# Patient Record
Sex: FEMALE | Race: WHITE | NOT HISPANIC OR LATINO | Employment: FULL TIME | URBAN - METROPOLITAN AREA
[De-identification: names, ages, dates, MRNs, and addresses within clinical notes are randomized per-mention and may not be internally consistent; named-entity substitution may affect disease eponyms.]

---

## 2019-02-22 ENCOUNTER — OFFICE VISIT CONVERTED (OUTPATIENT)
Dept: FAMILY MEDICINE CLINIC | Facility: CLINIC | Age: 29
End: 2019-02-22
Attending: NURSE PRACTITIONER

## 2019-07-23 ENCOUNTER — OFFICE VISIT CONVERTED (OUTPATIENT)
Dept: FAMILY MEDICINE CLINIC | Facility: CLINIC | Age: 29
End: 2019-07-23
Attending: NURSE PRACTITIONER

## 2019-07-23 ENCOUNTER — CONVERSION ENCOUNTER (OUTPATIENT)
Dept: FAMILY MEDICINE CLINIC | Facility: CLINIC | Age: 29
End: 2019-07-23

## 2020-06-09 ENCOUNTER — OFFICE VISIT CONVERTED (OUTPATIENT)
Dept: FAMILY MEDICINE CLINIC | Facility: CLINIC | Age: 30
End: 2020-06-09
Attending: NURSE PRACTITIONER

## 2020-09-03 ENCOUNTER — HOSPITAL ENCOUNTER (OUTPATIENT)
Dept: URGENT CARE | Facility: CLINIC | Age: 30
Discharge: HOME | End: 2020-09-03

## 2020-09-06 LAB — SARS-COV-2 RNA SPEC QL NAA+PROBE: NOT DETECTED

## 2021-03-26 ENCOUNTER — OFFICE VISIT CONVERTED (OUTPATIENT)
Dept: FAMILY MEDICINE CLINIC | Facility: CLINIC | Age: 31
End: 2021-03-26
Attending: NURSE PRACTITIONER

## 2021-05-13 NOTE — PROGRESS NOTES
"   Progress Note      Patient Name: Khadijah Sierra   Patient ID: [id number]   Sex: Female   YOB: 1990    Primary Care Provider: Bradly Sandoval DO    Visit Date: June 9, 2020    Provider: ELIZABETH Ramirez   Location: [facility]   Location Address: [address] [place] [address], [address] [place] [address]  [id number]   Location Phone: [phone]          Chief Complaint  · Medication refills      History Of Present Illness  Khadijah Sierra is a 30 year old /White female who presents for evaluation and treatment of:      Patient presents to the office today for concerns of anxiety and depression.  She states that the fluoxetine and BuSpar is not working as effectively as it once did.  Patient states that her  was recently deployed and will not be back for 1 year.  She states that her anxiety has been exacerbated because of the increased possibilities as being a single parent as well as working and maintaining the house.  She states that her anxiety causes an increase in her depression.  Patient does state that she tried to take 15 mg of buspirone twice a day but this did not help as well.  He denies any suicidal or homicidal ideations at this time       Medication List  Ortho Tri-Cyclen LO (28) 0.18/0.215/0.25 mg-25 mcg oral tablet         Allergy List  NO KNOWN DRUG ALLERGIES         Social History  Tobacco (Never)         Review of Systems  · Constitutional  o Denies  o : fever, fatigue, weight loss, weight gain  · Cardiovascular  o Denies  o : lower extremity edema, claudication, chest pressure, palpitations  · Respiratory  o Denies  o : shortness of breath, wheezing, cough, hemoptysis, dyspnea on exertion  · Gastrointestinal  o Denies  o : nausea, vomiting, diarrhea, constipation, abdominal pain      Vitals  Date Time BP Position Site L\R Cuff Size HR RR TEMP (F) WT  HT  BMI kg/m2 BSA m2 O2 Sat HC       06/09/2020 01:21 /78 Sitting    58 - R 16 99 151lbs 0oz 5'  7\" 23.65 1.8 97 " %          Physical Examination  · Constitutional  o Appearance  o : well-nourished, in no acute distress  · Neck  o Inspection/Palpation  o : normal appearance, no masses or tenderness, trachea midline  o Range of Motion  o : cervical range of motion within normal limits  o Thyroid  o : gland size normal, nontender, no nodules or masses present on palpation  · Respiratory  o Respiratory Effort  o : breathing unlabored  o Inspection of Chest  o : normal appearance  o Auscultation of Lungs  o : normal breath sounds throughout inspiration and expiration  · Cardiovascular  o Heart  o :   § Auscultation of Heart  § : regular rate and rhythm, no murmurs, gallops or rubs  o Peripheral Vascular System  o :   § Extremities  § : no clubbing or edema  · Skin and Subcutaneous Tissue  o General Inspection  o : no rashes or lesions present, no areas of discoloration  o Body Hair  o : hair normal for age, general body hair distribution normal for age  o Digits and Nails  o : no clubbing, cyanosis, deformities or edema present, normal appearing nails  · Neurologic  o Mental Status Examination  o :   § Orientation  § : grossly oriented to person, place and time  o Gait and Station  o : normal gait, able to stand without difficulty  · Psychiatric  o Judgement and Insight  o : judgment and insight intact  o Mood and Affect  o : mood normal, affect appropriate  o Presence of Abnormal Thoughts  o : no hallucinations, no delusions present, no psychotic thoughts          Assessment  · Screening for depression     V79.0/Z13.89  · Anxiety disorder     300.00/F41.9  · Major depressive disorder     296.20/F32.2      Plan  · Orders  o Annual depression screening, 15 minutes (69690, ) - V79.0/Z13.89 - 06/09/2020  o ACO-18: Positive screen for clinical depression using a standardized tool and a follow-up plan documented () - V79.0/Z13.89 - 06/09/2020  o ACO-39: Current medications updated and reviewed () - - 06/09/2020  o ACO-14:  "Influenza immunization administered or previously received () - - 06/09/2020  · Medications  o Viibryd 40 mg oral tablet   SIG: take 1 tablet (40 mg) by oral route once daily with food   DISP: (30) tablets with 5 refills  Prescribed on 06/09/2020     o fluoxetine 40 mg oral capsule   SIG: TAKE 1 CAPSULE BY MOUTH EVERY EVENING   DISP: (30) Capsule with 0 refills  Discontinued on 06/09/2020     o Medications have been Reconciled  o Transition of Care or Provider Policy  · Instructions  o Depression Screen completed and scanned into the EMR under the designated folder within the patient's documents.  o Today's PHQ-9 result is ___10  o Discussed the need for therapy, either with a certified counselor, psychologist, and/or family . If no improvement is noted or worsening of their condition, return to office or ER. But also discussed with patient that if they are non-responsive to the type of medication they may need to see a psychiatrist for further evaluation and management.  o Patient was given an SSRI/SSNRI medication and warned of possible side effects of the medication including potential for increased risk of suicidal thoughts and feelings. Patient was instructed that if they begin to exhibit any of these effects they will discontinue the medication immediately and contact our office or the ER ASAP.  o Patient agrees to a \"No Self Harm\" contract. Patient will either call us, [facility], ER, [facility], [facility].  o Patient was educated/instructed on their diagnosis, treatment and medications prior to discharge from the clinic today.  o Time spent with the patient was minutes, more than 50% face to face.  o Electronically Identified Patient Education Materials Provided Electronically  · Disposition  o Call or Return if symptoms worsen or persist.  o follow up in 6 months            Electronically Signed by: ELIZABETH Ramirez -Author on June 9, 2020 02:55:44 PM  "

## 2021-05-14 VITALS
HEART RATE: 87 BPM | SYSTOLIC BLOOD PRESSURE: 112 MMHG | TEMPERATURE: 98.2 F | OXYGEN SATURATION: 97 % | DIASTOLIC BLOOD PRESSURE: 68 MMHG | HEIGHT: 67 IN | WEIGHT: 127 LBS | BODY MASS INDEX: 19.93 KG/M2

## 2021-05-14 NOTE — PROGRESS NOTES
Progress Note      Patient Name: Khadijah Sierra   Patient ID: [id number]   Sex: Female   YOB: 1990    Primary Care Provider: Bradly Sandoval DO    Visit Date: March 26, 2021    Provider: ELIZABETH Ramirez   Location: [facility]   Location Address: [address] [place] [address], [address] [place] [address]  [id number]   Location Phone: [phone]          Chief Complaint  · Talk about depression and anxiety      History Of Present Illness  Khadijah Sierra is a 30 year old /White female who presents for evaluation and treatment of:      Patient presents to the office today to discuss her anxiety and depression.  Patient states that it seems to be getting worse and the BuSpar is not helping.  Patient does state that the Viibryd has helped in the past and wants to continue this.  She states that she is also used hydroxyzine in the past but this just makes her sleepy.  Patient states that she had switched jobs due to drama at work.  She also states that her  has been on deployment for the last 9 months and was scheduled to return today.  He states that they tested positive for the Covid so they are in quarantine for another 10 days.    Patient states that her anxiety is worse due to her  having an affair on her  while he was deployed.  She states that he is unsure if she is even in love with him and does not know how things are going ago when he gets back in 10 days.  Patient also is anxious due to her knowing that she has to tell him that she did have the affair.       Medication List  Name Date Started Instructions   buspirone 7.5 mg oral tablet 03/26/2021 take 1 tablet (7.5 mg) by oral route 2 times per day   Tri-Lo-Sprintec 0.18/0.215/0.25 mg-25 mcg oral tablet 08/25/2020 take 1 tablet by oral route once daily for 30 days   Viibryd 40 mg oral tablet 03/26/2021 take 1 tablet (40 mg) by oral route once daily with food         Allergy List  Allergen Name Date  "Reaction Notes   NO KNOWN DRUG ALLERGIES --  --  --        Allergies Reconciled  Social History  Finding Status Start/Stop Quantity Notes   Tobacco Never --/-- --  --          Immunizations  NameDate Admin Mfg Trade Name Lot Number Route Inj VIS Given VIS Publication   InfluenzaDeferred 03/26/2021 NE Not Entered  NE NE     Comments:          Review of Systems  · Constitutional  o Denies  o : fever, fatigue, weight loss, weight gain  · Cardiovascular  o Denies  o : lower extremity edema, claudication, chest pressure, palpitations  · Respiratory  o Denies  o : shortness of breath, wheezing, cough, hemoptysis, dyspnea on exertion  · Gastrointestinal  o Denies  o : nausea, vomiting, diarrhea, constipation, abdominal pain  · Psychiatric  o Admits  o : anxiety, depression      Vitals  Date Time BP Position Site L\R Cuff Size HR RR TEMP (F) WT  HT  BMI kg/m2 BSA m2 O2 Sat FR L/min FiO2 HC       03/26/2021 01:24 /68 Sitting    87 - R  98.2 126lbs 16oz 5'  7\" 19.89 1.65 97 %            Physical Examination  · Constitutional  o Appearance  o : well-nourished, in no acute distress  · Neck  o Inspection/Palpation  o : normal appearance, no masses or tenderness, trachea midline  o Range of Motion  o : cervical range of motion within normal limits  o Thyroid  o : gland size normal, nontender, no nodules or masses present on palpation  · Respiratory  o Respiratory Effort  o : breathing unlabored  o Inspection of Chest  o : normal appearance  o Auscultation of Lungs  o : normal breath sounds throughout inspiration and expiration  · Cardiovascular  o Heart  o :   § Auscultation of Heart  § : regular rate and rhythm, no murmurs, gallops or rubs  o Peripheral Vascular System  o :   § Extremities  § : no clubbing or edema  · Skin and Subcutaneous Tissue  o General Inspection  o : no rashes or lesions present, no areas of discoloration  o Body Hair  o : hair normal for age, general body hair distribution normal for age  o Digits " "and Nails  o : no clubbing, cyanosis, deformities or edema present, normal appearing nails  · Neurologic  o Mental Status Examination  o :   § Orientation  § : grossly oriented to person, place and time  o Gait and Station  o : normal gait, able to stand without difficulty  · Psychiatric  o Judgement and Insight  o : judgment and insight intact  o Mood and Affect  o : mood normal, affect appropriate  o Presence of Abnormal Thoughts  o : no hallucinations, no delusions present, no psychotic thoughts          Assessment  · Anxiety disorder     300.00/F41.9  · Major depressive disorder     296.20/F32.2      Plan  · Orders  o ACO-39: Current medications updated and reviewed (1159F, ) - - 03/26/2021  · Medications  o Wellbutrin  mg oral tablet extended release 24 hr   SIG: take 1 tablet (150 mg) by oral route once daily   DISP: (90) Tablet with 1 refills  Prescribed on 03/26/2021     o Viibryd 40 mg oral tablet   SIG: take 1 tablet (40 mg) by oral route once daily with food   DISP: (90) Tablet with 3 refills  Refilled on 03/26/2021     o Medications have been Reconciled  o Transition of Care or Provider Policy  · Instructions  o Discussed the need for therapy, either with a certified counselor, psychologist, and/or family . If no improvement is noted or worsening of their condition, return to office or ER. But also discussed with patient that if they are non-responsive to the type of medication they may need to see a psychiatrist for further evaluation and management.  o Patient was given an SSRI/SSNRI medication and warned of possible side effects of the medication including potential for increased risk of suicidal thoughts and feelings. Patient was instructed that if they begin to exhibit any of these effects they will discontinue the medication immediately and contact our office or the ER ASAP.  o Patient agrees to a \"No Self Harm\" contract. Patient will either call us, [facility], ER, [facility] " [facility].  o Patient was educated/instructed on their diagnosis, treatment and medications prior to discharge from the clinic today.  o Minutes spent with patient including greater than 50% in Education/Counseling/Care Coordination.  o Time spent with the patient was minutes, more than 50% face to face.  o Electronically Identified Patient Education Materials Provided Electronically  · Disposition  o Call or Return if symptoms worsen or persist.  o Follow up in 3 months            Electronically Signed by: ELIZABETH Ramirez -Author on March 26, 2021 02:37:15 PM

## 2021-05-15 VITALS
SYSTOLIC BLOOD PRESSURE: 101 MMHG | HEART RATE: 82 BPM | TEMPERATURE: 98.7 F | RESPIRATION RATE: 18 BRPM | BODY MASS INDEX: 21.35 KG/M2 | HEIGHT: 67 IN | DIASTOLIC BLOOD PRESSURE: 61 MMHG | OXYGEN SATURATION: 99 % | WEIGHT: 136 LBS

## 2021-05-15 VITALS
TEMPERATURE: 99 F | DIASTOLIC BLOOD PRESSURE: 78 MMHG | WEIGHT: 151 LBS | OXYGEN SATURATION: 97 % | RESPIRATION RATE: 16 BRPM | BODY MASS INDEX: 23.7 KG/M2 | SYSTOLIC BLOOD PRESSURE: 116 MMHG | HEART RATE: 58 BPM | HEIGHT: 67 IN

## 2021-05-15 VITALS
SYSTOLIC BLOOD PRESSURE: 114 MMHG | RESPIRATION RATE: 18 BRPM | BODY MASS INDEX: 23.07 KG/M2 | TEMPERATURE: 98.5 F | HEART RATE: 80 BPM | DIASTOLIC BLOOD PRESSURE: 62 MMHG | WEIGHT: 147 LBS | OXYGEN SATURATION: 98 % | HEIGHT: 67 IN

## 2021-07-28 ENCOUNTER — TELEPHONE (OUTPATIENT)
Dept: FAMILY MEDICINE CLINIC | Facility: CLINIC | Age: 31
End: 2021-07-28

## 2021-07-28 NOTE — TELEPHONE ENCOUNTER
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 ALL OTHER SYMPTOMS ARE GONE EXCEPT SHORTNESS OF BREATH AND COUGH. COULD SOMETHING BE PHONED IN TO HELP PATIENT. PLEASE ADVISE PATIENT.

## 2021-07-28 NOTE — TELEPHONE ENCOUNTER
Caller: Khadijah Sierra    Relationship: Self    Best call back number: [phone]    What is the best time to reach you: ANYTIME    Who are you requesting to speak with (clinical staff, provider,  specific staff member): CLINICAL        What was the call regarding: PATIENT CALLED BACK TO CHECK ON STATUS OF PREVIOUS MESSAGE LEFT AND ALSO NOW IS REPORTING THAT SHE IS BREAKING OUT IN HIVES ALL OVER. ONLY THING PATIENT IS USING IS AN OVER THE COUNTER COUGH SUPPRESANT THAT SHE HAS USED BEFORE WITH NO ISSUES.                   Do you require a callback: YES

## 2021-09-27 ENCOUNTER — TELEPHONE (OUTPATIENT)
Dept: FAMILY MEDICINE CLINIC | Facility: CLINIC | Age: 31
End: 2021-09-27

## 2021-09-27 NOTE — TELEPHONE ENCOUNTER
Caller: Khadijah Sierra    Relationship: Self      Medication requested (name and dosage): buPROPion XL (WELLBUTRIN XL) 150 MG 24 hr tablet    Pharmacy where request should be sent: [id number] - [place], [place] - [address] AT [facility] - [phone]  - [phone]   [phone]    Additional details provided by patient:     Best call back number: [phone]    Does the patient have less than a 3 day supply:  [x] Yes  [] No    Cary Flores Rep   09/27/21 14:44 EDT

## 2021-09-28 RX ORDER — BUPROPION HYDROCHLORIDE 150 MG/1
150 TABLET ORAL EVERY MORNING
Qty: 90 TABLET | Refills: 0 | Status: SHIPPED | OUTPATIENT
Start: 2021-09-28 | End: 2021-09-29

## 2021-09-29 RX ORDER — BUPROPION HYDROCHLORIDE 150 MG/1
150 TABLET ORAL EVERY MORNING
Qty: 90 TABLET | Refills: 0 | Status: SHIPPED | OUTPATIENT
Start: 2021-09-29 | End: 2022-04-19 | Stop reason: SDUPTHER

## 2021-10-28 RX ORDER — NORGESTIMATE AND ETHINYL ESTRADIOL
KIT
Qty: 28 TABLET | Refills: 2 | Status: SHIPPED | OUTPATIENT
Start: 2021-10-28 | End: 2022-02-09 | Stop reason: SDUPTHER

## 2021-12-21 RX ORDER — VILAZODONE HYDROCHLORIDE 40 MG/1
TABLET ORAL
Qty: 30 TABLET | Status: CANCELLED | OUTPATIENT
Start: 2021-12-21

## 2021-12-21 NOTE — TELEPHONE ENCOUNTER
Patient is switching their insurance and it doesn't kick in until the first of the year. Patient is wanting to know if there is any way possible they can have some samples until their insurance is active next month.

## 2021-12-22 RX ORDER — VILAZODONE HYDROCHLORIDE 40 MG/1
40 TABLET ORAL DAILY
Qty: 28 TABLET | Refills: 0 | COMMUNITY
Start: 2021-12-22 | End: 2022-01-14 | Stop reason: SDUPTHER

## 2022-01-14 RX ORDER — VILAZODONE HYDROCHLORIDE 40 MG/1
40 TABLET ORAL DAILY
Qty: 30 TABLET | Refills: 2 | Status: SHIPPED | OUTPATIENT
Start: 2022-01-14 | End: 2022-01-19

## 2022-01-14 RX ORDER — VILAZODONE HYDROCHLORIDE 40 MG/1
40 TABLET ORAL DAILY
Qty: 28 TABLET | Refills: 0 | COMMUNITY
Start: 2022-01-14

## 2022-01-14 NOTE — TELEPHONE ENCOUNTER
Caller: Khadijah Sierra    Relationship: Self    Best call back number: [phone]     Requested Prescriptions:   Requested Prescriptions     Pending Prescriptions Disp Refills   • Viibryd 40 MG tablet tablet 28 tablet 0     Sig: Take 1 tablet by mouth Daily.        Pharmacy where request should be sent: [id number] [place] - [address] [facility] AT [facility] [phone] [facility] [phone] FX       Does the patient have less than a 3 day supply:  [] Yes  [x] No    Cary Doherty Rep   01/14/22 11:30 EST

## 2022-01-18 RX ORDER — BUPROPION HYDROCHLORIDE 150 MG/1
150 TABLET ORAL EVERY MORNING
Qty: 90 TABLET | Refills: 0 | OUTPATIENT
Start: 2022-01-18

## 2022-01-18 NOTE — TELEPHONE ENCOUNTER
LV: 3/26/2021  NV: none, will call and inform patient that they need to schedule an appointment.  LABS: 7/8/2020

## 2022-01-19 ENCOUNTER — OFFICE VISIT (OUTPATIENT)
Dept: FAMILY MEDICINE CLINIC | Facility: CLINIC | Age: 32
End: 2022-01-19

## 2022-01-19 VITALS
SYSTOLIC BLOOD PRESSURE: 110 MMHG | OXYGEN SATURATION: 99 % | WEIGHT: 154.2 LBS | BODY MASS INDEX: 23.37 KG/M2 | TEMPERATURE: 98.6 F | DIASTOLIC BLOOD PRESSURE: 64 MMHG | HEIGHT: 68 IN | HEART RATE: 92 BPM

## 2022-01-19 DIAGNOSIS — F33.0 MAJOR DEPRESSIVE DISORDER, RECURRENT, MILD: Primary | ICD-10-CM

## 2022-01-19 DIAGNOSIS — F41.9 ANXIETY: ICD-10-CM

## 2022-01-19 PROCEDURE — 99213 OFFICE O/P EST LOW 20 MIN: CPT | Performed by: NURSE PRACTITIONER

## 2022-01-19 RX ORDER — ESCITALOPRAM OXALATE 10 MG/1
10 TABLET ORAL DAILY
Qty: 90 TABLET | Refills: 0 | Status: SHIPPED | OUTPATIENT
Start: 2022-01-19 | End: 2022-03-15

## 2022-01-19 NOTE — PROGRESS NOTES
"Chief Complaint  Anxiety and Depression    Subjective          Khadijah Sierra presents to [facility]  Patient presents to the office today for follow-up regarding her anxiety and depression.  Patient states that her insurance is no longer covering the Viibryd and she would like to transition to Lexapro.  I did discuss how we would wean off of the Viibryd and transition to Lexapro.  Patient verbalized understanding of the titration.  She denies any other concerns or complaints at this time.      Objective   Vital Signs:   /64 (BP Location: Right arm, Patient Position: Sitting, Cuff Size: Adult)   Pulse 92   Temp 98.6 °F (37 °C) (Temporal)   Ht 172.7 cm (68\")   Wt 69.9 kg (154 lb 3.2 oz)   SpO2 99%   BMI 23.45 kg/m²     Physical Exam  Vitals reviewed.   Constitutional:       Appearance: Normal appearance.   Cardiovascular:      Rate and Rhythm: Normal rate and regular rhythm.      Heart sounds: Normal heart sounds, S1 normal and S2 normal. No murmur heard.      Pulmonary:      Effort: Pulmonary effort is normal. No respiratory distress.      Breath sounds: Normal breath sounds.   Skin:     General: Skin is warm and dry.   Neurological:      Mental Status: She is alert and oriented to person, place, and time.   Psychiatric:         Attention and Perception: Attention normal.         Mood and Affect: Mood normal.         Behavior: Behavior normal.        Result Review :                Assessment and Plan    Diagnoses and all orders for this visit:    1. Major depressive disorder, recurrent, mild (HCC) (Primary)  -     escitalopram (Lexapro) 10 MG tablet; Take 1 tablet by mouth Daily.  Dispense: 90 tablet; Refill: 0    2. Anxiety  -     escitalopram (Lexapro) 10 MG tablet; Take 1 tablet by mouth Daily.  Dispense: 90 tablet; Refill: 0        Follow Up   Return in about 6 months (around 7/19/2022) for Recheck.  Patient was given instructions and counseling regarding her " condition or for health maintenance advice. Please see specific information pulled into the AVS if appropriate.

## 2022-02-09 ENCOUNTER — OFFICE VISIT (OUTPATIENT)
Dept: OBSTETRICS AND GYNECOLOGY | Facility: CLINIC | Age: 32
End: 2022-02-09

## 2022-02-09 VITALS
HEIGHT: 68 IN | DIASTOLIC BLOOD PRESSURE: 68 MMHG | WEIGHT: 151.4 LBS | SYSTOLIC BLOOD PRESSURE: 109 MMHG | HEART RATE: 85 BPM | BODY MASS INDEX: 22.94 KG/M2

## 2022-02-09 DIAGNOSIS — Z01.419 ENCOUNTER FOR GYNECOLOGICAL EXAMINATION WITHOUT ABNORMAL FINDING: Primary | ICD-10-CM

## 2022-02-09 DIAGNOSIS — Z87.42 H/O ABNORMAL CERVICAL PAPANICOLAOU SMEAR: ICD-10-CM

## 2022-02-09 DIAGNOSIS — Z30.41 ENCOUNTER FOR SURVEILLANCE OF CONTRACEPTIVE PILLS: ICD-10-CM

## 2022-02-09 PROCEDURE — G0123 SCREEN CERV/VAG THIN LAYER: HCPCS | Performed by: OBSTETRICS & GYNECOLOGY

## 2022-02-09 PROCEDURE — 99395 PREV VISIT EST AGE 18-39: CPT | Performed by: OBSTETRICS & GYNECOLOGY

## 2022-02-09 PROCEDURE — 87624 HPV HI-RISK TYP POOLED RSLT: CPT | Performed by: OBSTETRICS & GYNECOLOGY

## 2022-02-09 RX ORDER — NORGESTIMATE AND ETHINYL ESTRADIOL 7DAYSX3 LO
1 KIT ORAL DAILY
Qty: 84 TABLET | Refills: 3 | Status: SHIPPED | OUTPATIENT
Start: 2022-02-09 | End: 2022-08-02

## 2022-02-09 NOTE — PROGRESS NOTES
"Well Woman Visit    CC: Annual well woman exam       HPI:   31 y.o. Contraception or HRT: APCONTRACEPTIONHRT: Contraception:  Birth control pill  Menses:   q mon, lasts 5 days, no heavy days  Pain:  None  Incontinence concerns: No  Hx of abnormal pap:  Yes, states last pap was abnormal and she had a colpo by dr santana and thinks everything was okay because she never received a phone call.  Pt has no complaints today.      History: PMHx, Meds, Allergies, PSHx, Social Hx, and POBHx all reviewed and updated.      PHYSICAL EXAM:  /68 (BP Location: Right arm, Patient Position: Sitting, Cuff Size: Small Adult)   Pulse 85   Ht 172.7 cm (67.99\")   Wt 68.7 kg (151 lb 6.4 oz)   LMP 2022 (Exact Date)   Breastfeeding No   BMI 23.03 kg/m²   General- NAD, alert and oriented, appropriate  Psych- Normal mood, good memory  Neck- No masses, no thyroid enlargement  CV- Regular rhythm, no murnurs  Resp- CTA to bases, no wheezes  Abdomen- Soft, non distended, non tender, no masses    Breast left-  Bilaterally symmetrical, no masses, non tender, no nipple discharge  Breast right- Bilaterally symmetrical, no masses, non tender, no nipple discharge    External genitalia- Normal female, no lesions  Urethra/meatus- Normal, no masses, non tender, no prolapse  Bladder- Normal, no masses, non tender, no prolapse  Vagina- Normal, no atrophy, no lesions, no discharge, no prolapse  Cvx- Normal, no lesions, no discharge, No cervical motion tenderness  Uterus- Normal size, shape & consistency.  Non tender, mobile, & no prolapse  Adnexa- No mass, non tender  Anus/Rectum/Perineum- Not performed    Lymphatic- No palpable neck, axillary, or groin nodes  Ext- No edema, no cyanosis    Skin- No lesions, no rashes, no acanthosis nigricans        ASSESSMENT and PLAN:  WWE and Contraception    Diagnoses and all orders for this visit:    1. Encounter for gynecological examination without abnormal finding (Primary)  -     Cancel: " IgP, Aptima HPV; Future  -     IgP, Aptima HPV    2. Encounter for surveillance of contraceptive pills  -     norgestimate-ethinyl estradiol (Tri-Lo-Sprintec) 0.18/0.215/0.25 MG-25 MCG per tablet; Take 1 tablet by mouth Daily.  Dispense: 84 tablet; Refill: 3    3. H/O abnormal cervical Papanicolaou smear  -     Cancel: IgP, Aptima HPV; Future  -     IgP, Aptima HPV    happy with current bcp and desires to continue. Will get co-test today. Unable to find scanned results of abnormal pap/colpo.    Counseling:     OCP/Hormone use risk CELSO  Track menses, RTO IF <q21d, >7d long, or heavy    Domestic violence/abuse screen: negative    Depression screen: no SI    Preventative:   BREAST HEALTH- Monthly self breast exam importance and how to reviewed. MMG and/or MRI (prn) reviewed per society guidelines and her individual history. Mammo/MRI screen: Not medically needed.  CERVICAL CANCER Screening- Reviewed current ASCCP guidelines for screening w and wo cotest HPV, age specific.  Screen: Updated today.  COLON CANCER Screening- Reviewed current medical society guidelines and options.  Colonoscopy screen:  Not medically needed.  SEXUAL HEALTH: Declines STD screening.  VACCINATIONS Recommended: Flu annually, Gardisil/HPV vaccine (up to 46yo).  Importance discussed, risk being unvaccinated reviewed.  Questions answered  Smoking status- NON SMOKER.  Importance of avoiding second hand smoke.  Myriad: Does not qualify.  Gardasil status: declined.      She understands the importance of having any ordered tests to be performed in a timely fashion.  She is encouraged to review her results online and/or contact or office if she has questions.     Follow Up:  Return if symptoms worsen or fail to improve.      Ruchi Joya, APRN  02/09/2022

## 2022-02-13 LAB
CYTOLOGIST CVX/VAG CYTO: NORMAL
CYTOLOGY CVX/VAG DOC CYTO: NORMAL
CYTOLOGY CVX/VAG DOC THIN PREP: NORMAL
DX ICD CODE: NORMAL
HIV 1 & 2 AB SER-IMP: NORMAL
HPV I/H RISK 4 DNA CVX QL PROBE+SIG AMP: NEGATIVE
OTHER STN SPEC: NORMAL
STAT OF ADQ CVX/VAG CYTO-IMP: NORMAL

## 2022-03-15 ENCOUNTER — TELEPHONE (OUTPATIENT)
Dept: FAMILY MEDICINE CLINIC | Facility: CLINIC | Age: 32
End: 2022-03-15

## 2022-03-15 ENCOUNTER — OFFICE VISIT (OUTPATIENT)
Dept: FAMILY MEDICINE CLINIC | Facility: CLINIC | Age: 32
End: 2022-03-15

## 2022-03-15 ENCOUNTER — HOSPITAL ENCOUNTER (OUTPATIENT)
Dept: GENERAL RADIOLOGY | Facility: HOSPITAL | Age: 32
Discharge: HOME | End: 2022-03-15
Admitting: STUDENT IN AN ORGANIZED HEALTH CARE EDUCATION/TRAINING PROGRAM

## 2022-03-15 VITALS
BODY MASS INDEX: 22.43 KG/M2 | SYSTOLIC BLOOD PRESSURE: 108 MMHG | RESPIRATION RATE: 18 BRPM | HEART RATE: 97 BPM | OXYGEN SATURATION: 97 % | TEMPERATURE: 97.8 F | HEIGHT: 68 IN | DIASTOLIC BLOOD PRESSURE: 64 MMHG | WEIGHT: 148 LBS

## 2022-03-15 DIAGNOSIS — M25.511 CHRONIC RIGHT SHOULDER PAIN: ICD-10-CM

## 2022-03-15 DIAGNOSIS — M25.511 CHRONIC RIGHT SHOULDER PAIN: Primary | ICD-10-CM

## 2022-03-15 DIAGNOSIS — G89.29 CHRONIC RIGHT SHOULDER PAIN: Primary | ICD-10-CM

## 2022-03-15 DIAGNOSIS — G89.29 CHRONIC RIGHT SHOULDER PAIN: ICD-10-CM

## 2022-03-15 DIAGNOSIS — F41.9 ANXIETY: ICD-10-CM

## 2022-03-15 DIAGNOSIS — F33.0 MAJOR DEPRESSIVE DISORDER, RECURRENT, MILD: ICD-10-CM

## 2022-03-15 PROCEDURE — 73030 X-RAY EXAM OF SHOULDER: CPT

## 2022-03-15 PROCEDURE — 99213 OFFICE O/P EST LOW 20 MIN: CPT | Performed by: STUDENT IN AN ORGANIZED HEALTH CARE EDUCATION/TRAINING PROGRAM

## 2022-03-15 RX ORDER — CYCLOBENZAPRINE HCL 10 MG
10 TABLET ORAL 2 TIMES DAILY PRN
Qty: 30 TABLET | Refills: 0 | Status: SHIPPED | OUTPATIENT
Start: 2022-03-15 | End: 2022-08-02

## 2022-03-15 RX ORDER — ESCITALOPRAM OXALATE 20 MG/1
20 TABLET ORAL DAILY
Qty: 90 TABLET | Refills: 1 | Status: SHIPPED | OUTPATIENT
Start: 2022-03-15 | End: 2022-06-03 | Stop reason: ALTCHOICE

## 2022-03-15 NOTE — TELEPHONE ENCOUNTER
Pt was here for a visit today with Dr. Vu for an acute visit. She notes she is ready to increase her Lexapro to 20 mg if possible.    Yes

## 2022-03-15 NOTE — PROGRESS NOTES
"Chief Complaint  Complaining of right shoulder pain    Subjective         Khadijah Sierra is a 31 y.o. female who presents to [facility]    31 years old female comes to the clinic today for an acute visit.    Patient is complaining of few months history of worsening right shoulder pain, patient is a paramedic, does report heavy lifting at work.  Patient reports dull constant pain which gets worse with certain movements/heavy lifting.  Reports sometimes right shoulder pain radiates to bicep but denies any weakness/numbness/tingling.  Denies any shoulder trauma, physically very active, goes to gym a lot.      Off note; patient also want Lexapro refill and increased dose.  Already spoken with PMD that if symptoms uncontrolled plan is to increase Lexapro to 20 mg.  Review of Systems   Objective   Vital Signs:   Vitals:    03/15/22 1503   BP: 108/64   Pulse: 97   Resp: 18   Temp: 97.8 °F (36.6 °C)   SpO2: 97%   Weight: 67.1 kg (148 lb)   Height: 172.7 cm (67.99\")      Body mass index is 22.51 kg/m².   Physical Exam  Musculoskeletal:      Right shoulder: Normal. No swelling, deformity, effusion, laceration, tenderness, bony tenderness or crepitus. Normal range of motion. Normal strength. Normal pulse.      Left shoulder: Normal. No swelling.      Comments: No abnormal right shoulder exam               Assessment and Plan   Diagnoses and all orders for this visit:    1. Chronic right shoulder pain (Primary)  Comments:  Primary conservative management/muscle relaxant/x-ray, may consider CT/MRI/orthopedic if needed  Orders:  -     XR Shoulder 2+ View Right; Future  -     cyclobenzaprine (FLEXERIL) 10 MG tablet; Take 1 tablet by mouth 2 (Two) Times a Day As Needed for Muscle Spasms.  Dispense: 30 tablet; Refill: 0    2. Major depressive disorder, recurrent, mild (HCC)  Comments:  Patient would like to increase Lexapro due to mildly uncontrolled symptoms, already spoken with PCP.  I will " increase Lexapro/follow-up with PMD as instructed  Orders:  -     escitalopram (Lexapro) 20 MG tablet; Take 1 tablet by mouth Daily.  Dispense: 90 tablet; Refill: 1    3. Anxiety  Comments:  Patient to call the clinic if no improvement or any worsening, side effect profile of increased dose of Lexapro discussed  Orders:  -     escitalopram (Lexapro) 20 MG tablet; Take 1 tablet by mouth Daily.  Dispense: 90 tablet; Refill: 1      For right shoulder pain, primary conservative management discussed.  Patient may take ibuprofen if needed.  We have increase Lexapro due to mildly uncontrolled symptoms and I have instructed to follow-up back if any side effects or no improvement for further interventions and medications management.  Patient understands and agrees with the plan.      Follow Up   Return if symptoms worsen or fail to improve.  Patient was given instructions and counseling regarding her condition or for health maintenance advice. Please see specific information pulled into the AVS if appropriate.

## 2022-04-16 DIAGNOSIS — F41.9 ANXIETY: ICD-10-CM

## 2022-04-16 DIAGNOSIS — F33.0 MAJOR DEPRESSIVE DISORDER, RECURRENT, MILD: ICD-10-CM

## 2022-04-18 RX ORDER — ESCITALOPRAM OXALATE 10 MG/1
10 TABLET ORAL DAILY
Qty: 90 TABLET | Refills: 0 | OUTPATIENT
Start: 2022-04-18

## 2022-04-19 RX ORDER — BUPROPION HYDROCHLORIDE 150 MG/1
150 TABLET ORAL EVERY MORNING
Qty: 90 TABLET | Refills: 1 | Status: SHIPPED | OUTPATIENT
Start: 2022-04-19 | End: 2022-08-02

## 2022-04-19 NOTE — TELEPHONE ENCOUNTER
Caller: RigoKhadijah    Relationship: Self    Best call back number: [phone]     Requested Prescriptions:   Requested Prescriptions     Pending Prescriptions Disp Refills   • buPROPion XL (WELLBUTRIN XL) 150 MG 24 hr tablet 90 tablet 0     Sig: Take 1 tablet by mouth Every Morning.        Pharmacy where request should be sent: [id number] [place] - [address] [facility] AT [facility] [phone] [facility] [phone] FX     Additional details provided by patient: COMPLETELY OUT    Does the patient have less than a 3 day supply:  [x] Yes  [] No    Cary JANG   04/19/22 09:18 EDT

## 2022-06-03 ENCOUNTER — TELEPHONE (OUTPATIENT)
Dept: FAMILY MEDICINE CLINIC | Facility: CLINIC | Age: 32
End: 2022-06-03

## 2022-06-03 NOTE — TELEPHONE ENCOUNTER
Caller: Khadijah Sierra    Relationship: Self    Best call back number: [phone]    What medication are you requesting: ZOLOFT    What are your current symptoms: ANXIETY AND DEPRESSION    Have you had these symptoms before:    [x] Yes  [] No    Have you been treated for these symptoms before:   [x] Yes  [] No    If a prescription is needed, what is your preferred pharmacy and phone number: [facility] #[id number] - [place], [place] - [address] AT [facility] [phone] [facility] [phone]      Additional notes:  PATIENT STATES SHE IS PREGNANT AND WOULD LIKE TO BE PUT BACK ON ZOLOFT AS IT IS SAFE FOR PREGNANCY. PATIENT STATES SHE WOULD LIKE TO COME OFF OF THE OTHER MEDICATIONS. PLEASE CALL PATIENT AND ADVISE.

## 2022-07-22 ENCOUNTER — TELEPHONE (OUTPATIENT)
Dept: FAMILY MEDICINE CLINIC | Facility: CLINIC | Age: 32
End: 2022-07-22

## 2022-07-22 RX ORDER — SERTRALINE HYDROCHLORIDE 100 MG/1
100 TABLET, FILM COATED ORAL DAILY
Qty: 30 TABLET | Refills: 3 | Status: SHIPPED | OUTPATIENT
Start: 2022-07-22 | End: 2022-10-26

## 2022-07-22 NOTE — TELEPHONE ENCOUNTER
Caller: Khadijah Sierra    Relationship: Self    Best call back number: [phone]    What medications are you currently taking:   Current Outpatient Medications on File Prior to Visit   Medication Sig Dispense Refill   • buPROPion XL (WELLBUTRIN XL) 150 MG 24 hr tablet Take 1 tablet by mouth Every Morning. 90 tablet 1   • busPIRone (BUSPAR) 7.5 MG tablet buspirone 7.5 mg oral tablet take 1 tablet (7.5 mg) by oral route 2 times per day 3/26/2021  Active     • cyclobenzaprine (FLEXERIL) 10 MG tablet Take 1 tablet by mouth 2 (Two) Times a Day As Needed for Muscle Spasms. 30 tablet 0   • norgestimate-ethinyl estradiol (Tri-Lo-Sprintec) 0.18/0.215/0.25 MG-25 MCG per tablet Take 1 tablet by mouth Daily. 84 tablet 3   • sertraline (Zoloft) 50 MG tablet Take 1 tablet by mouth Daily. 90 tablet 1     No current facility-administered medications on file prior to visit.          When did you start taking these medications: 06/22/22    Which medication are you concerned about: ZOLOFT    Who prescribed you this medication: BHUMI STAFFORD    What are your concerns: THE PATIENT'S MEDICATION WAS INCREASED TO 100MG BUT HER PRESCRIPTION WAS ONLY FOR 50MG. SHE TOOK 100MG DAILY AND NOW IS ENTIRELY OUT OF MEDICATION. SHE WOULD LIKE THIS PRESCRIPTION UPDATED TO 100MG AND A REFILL SENT TO:  [email or web address] #[id number] - [place], [place] - [address] AT [facility] - [phone] [facility] [phone]   [phone]    How long have you had these concerns: 07/22/22

## 2022-07-22 NOTE — TELEPHONE ENCOUNTER
Patient stated her Zoloft was increased to 100mg and needs a new prescription for it .     Pended zoloft 100mg

## 2022-08-02 ENCOUNTER — OFFICE VISIT (OUTPATIENT)
Dept: FAMILY MEDICINE CLINIC | Facility: CLINIC | Age: 32
End: 2022-08-02

## 2022-08-02 VITALS
OXYGEN SATURATION: 98 % | TEMPERATURE: 96.6 F | DIASTOLIC BLOOD PRESSURE: 57 MMHG | SYSTOLIC BLOOD PRESSURE: 107 MMHG | HEART RATE: 74 BPM | BODY MASS INDEX: 23.23 KG/M2 | HEIGHT: 67 IN | WEIGHT: 148 LBS

## 2022-08-02 DIAGNOSIS — F33.0 MAJOR DEPRESSIVE DISORDER, RECURRENT, MILD: Primary | ICD-10-CM

## 2022-08-02 PROCEDURE — 99213 OFFICE O/P EST LOW 20 MIN: CPT | Performed by: NURSE PRACTITIONER

## 2022-08-02 NOTE — PROGRESS NOTES
"Chief Complaint  Anxiety (Wants to change anxiety medication )    Subjective        Khadijah Sierra presents to [facility]  She presents to the office today wanting to discuss her anxiety and depression.  She states that she has been on the Zoloft for approximately 3 to 4 weeks.  She states that she has seen some improvement in her symptoms.  She states that she does not feel as if her symptoms are still well controlled.  She did want to discuss about possibly switching the Zoloft.  Does admit that she is currently trying to get pregnant.  I explained to the patient that due to this that I am not comfortable switching her medication to something different than Zoloft.  Did explain that we could increase the dosage to see if it was more efficacious.  Patient verbalizes understanding and agrees to plan.  Does state that she has been under more stress recently with work.  Denies any suicidal homicidal ideations.      Objective   Vital Signs:  /57 (BP Location: Right arm, Patient Position: Sitting, Cuff Size: Adult)   Pulse 74   Temp 96.6 °F (35.9 °C) (Temporal)   Ht 170.2 cm (67\")   Wt 67.1 kg (148 lb)   SpO2 98%   BMI 23.18 kg/m²   Estimated body mass index is 23.18 kg/m² as calculated from the following:    Height as of this encounter: 170.2 cm (67\").    Weight as of this encounter: 67.1 kg (148 lb).    BMI is within normal parameters. No other follow-up for BMI required.      Physical Exam  Vitals reviewed.   Constitutional:       Appearance: Normal appearance.   Cardiovascular:      Rate and Rhythm: Normal rate and regular rhythm.      Pulses: Normal pulses.      Heart sounds: Normal heart sounds, S1 normal and S2 normal. No murmur heard.  Pulmonary:      Effort: Pulmonary effort is normal. No respiratory distress.      Breath sounds: Normal breath sounds.   Skin:     General: Skin is warm and dry.   Neurological:      Mental Status: She is alert and oriented to " person, place, and time.   Psychiatric:         Attention and Perception: Attention normal.         Mood and Affect: Mood normal.         Behavior: Behavior normal.        Result Review :              Assessment and Plan   Diagnoses and all orders for this visit:    1. Major depressive disorder, recurrent, mild (HCC) (Primary)  -     sertraline (Zoloft) 50 MG tablet; Take 1 tablet by mouth Daily.  Dispense: 90 tablet; Refill: 1             Follow Up   Return in about 6 months (around 2/2/2023) for Recheck.  Patient was given instructions and counseling regarding her condition or for health maintenance advice. Please see specific information pulled into the AVS if appropriate.

## 2022-09-20 RX ORDER — BUPROPION HYDROCHLORIDE 150 MG/1
150 TABLET ORAL DAILY
Qty: 90 TABLET | Refills: 1 | Status: SHIPPED | OUTPATIENT
Start: 2022-09-20 | End: 2023-03-17

## 2022-10-26 RX ORDER — SERTRALINE HYDROCHLORIDE 100 MG/1
100 TABLET, FILM COATED ORAL DAILY
Qty: 30 TABLET | Refills: 3 | Status: SHIPPED | OUTPATIENT
Start: 2022-10-26 | End: 2022-11-29 | Stop reason: SDUPTHER

## 2022-11-15 DIAGNOSIS — F33.0 MAJOR DEPRESSIVE DISORDER, RECURRENT, MILD: ICD-10-CM

## 2022-11-29 ENCOUNTER — OFFICE VISIT (OUTPATIENT)
Dept: FAMILY MEDICINE CLINIC | Facility: CLINIC | Age: 32
End: 2022-11-29

## 2022-11-29 VITALS
DIASTOLIC BLOOD PRESSURE: 58 MMHG | HEIGHT: 67 IN | TEMPERATURE: 97.5 F | OXYGEN SATURATION: 98 % | BODY MASS INDEX: 22.13 KG/M2 | HEART RATE: 80 BPM | WEIGHT: 141 LBS | SYSTOLIC BLOOD PRESSURE: 100 MMHG

## 2022-11-29 DIAGNOSIS — F41.9 ANXIETY: Primary | ICD-10-CM

## 2022-11-29 DIAGNOSIS — F33.0 MAJOR DEPRESSIVE DISORDER, RECURRENT, MILD: ICD-10-CM

## 2022-11-29 DIAGNOSIS — Z30.41 ENCOUNTER FOR SURVEILLANCE OF CONTRACEPTIVE PILLS: ICD-10-CM

## 2022-11-29 PROCEDURE — 99214 OFFICE O/P EST MOD 30 MIN: CPT | Performed by: NURSE PRACTITIONER

## 2022-11-29 RX ORDER — NORGESTIMATE AND ETHINYL ESTRADIOL 7DAYSX3 LO
1 KIT ORAL DAILY
Qty: 84 TABLET | Refills: 1 | Status: SHIPPED | OUTPATIENT
Start: 2022-11-29 | End: 2023-11-29

## 2022-11-29 RX ORDER — SERTRALINE HYDROCHLORIDE 100 MG/1
100 TABLET, FILM COATED ORAL DAILY
Qty: 90 TABLET | Refills: 0 | Status: SHIPPED | OUTPATIENT
Start: 2022-11-29 | End: 2022-12-13 | Stop reason: ALTCHOICE

## 2022-11-29 NOTE — PROGRESS NOTES
"Chief Complaint  Anxiety (Genesight)    Subjective         Khadijah Sierra presents to [facility]  Patient presents to the office today to discuss her anxiety and depression.  She states that she has increased her Zoloft to 150 mg over time due to it not working as effectively.  She does state that she is also taking her Wellbutrin.  She states that the regimen is not working as good as she hoped.  She does state that she would like to get the GeneSight test completed so she would know for sure an appropriate regimen.  He denies any suicidal homicidal ideations at this time.       Objective     Vitals:    11/29/22 1111   BP: 100/58   BP Location: Right arm   Patient Position: Sitting   Cuff Size: Adult   Pulse: 80   Temp: 97.5 °F (36.4 °C)   TempSrc: Temporal   SpO2: 98%   Weight: 64 kg (141 lb)   Height: 170.2 cm (67\")      Body mass index is 22.08 kg/m².    BMI is within normal parameters. No other follow-up for BMI required.        Physical Exam  Vitals reviewed.   Constitutional:       Appearance: Normal appearance.   Cardiovascular:      Rate and Rhythm: Normal rate and regular rhythm.      Pulses: Normal pulses.      Heart sounds: Normal heart sounds, S1 normal and S2 normal. No murmur heard.  Pulmonary:      Effort: Pulmonary effort is normal. No respiratory distress.      Breath sounds: Normal breath sounds.   Skin:     General: Skin is warm and dry.   Neurological:      Mental Status: She is alert and oriented to person, place, and time.   Psychiatric:         Attention and Perception: Attention normal.         Mood and Affect: Mood normal.         Behavior: Behavior normal.          Result Review :   The following data was reviewed by: ELIZABETH Ramirez on 11/29/2022:      Procedures    Assessment and Plan   Diagnoses and all orders for this visit:    1. Anxiety (Primary)  -     sertraline (ZOLOFT) 100 MG tablet; Take 1 tablet by mouth Daily.  Dispense: 90 tablet; " Refill: 0    2. Major depressive disorder, recurrent, mild (HCC)  -     sertraline (ZOLOFT) 100 MG tablet; Take 1 tablet by mouth Daily.  Dispense: 90 tablet; Refill: 0    3. Encounter for surveillance of contraceptive pills  -     norgestimate-ethinyl estradiol (Tri-Lo-Sprintec) 0.18/0.215/0.25 MG-25 MCG per tablet; Take 1 tablet by mouth Daily.  Dispense: 84 tablet; Refill: 1          Follow Up   No follow-ups on file.  Patient was given instructions and counseling regarding her condition or for health maintenance advice. Please see specific information pulled into the AVS if appropriate.

## 2022-12-12 ENCOUNTER — TELEPHONE (OUTPATIENT)
Dept: FAMILY MEDICINE CLINIC | Facility: CLINIC | Age: 32
End: 2022-12-12

## 2022-12-12 NOTE — TELEPHONE ENCOUNTER
Caller: KUNAL- MERLY    Relationship: Other    Best call back number: [phone]    REFERENCE NUMBER: [id number]    What form or medical record are you requesting: CLINICAL NOTES          How would you like to receive the form or medical records (pick-up, mail, fax):          FAX    [phone]    Timeframe paperwork needed: ASAP    Additional notes:     KUNAL SAID THEY ARE NEEDING INFORMATION IN ORDER TO REVIEW THE  CLINICAL REQUEST. SHE SAID SHE WILL BE FAXING OVER A FORM FOR ANISH STAFFORD TO COMPLETE. SHE SAID IF THERE ARE ANY QUESTIONS TO CALL AT THE NUMBER ABOVE AND USE THE REFERENCE NUMBER.     SHE SAID WHEN THE PAPERWORK IS RETURN, IT SHOULD HAVE THE PROVIDER'S NAME, TAX OR NPI NUMBER, MEMBER ID AND

## 2022-12-13 RX ORDER — VENLAFAXINE HYDROCHLORIDE 37.5 MG/1
37.5 CAPSULE, EXTENDED RELEASE ORAL DAILY
Qty: 90 CAPSULE | Refills: 1 | Status: SHIPPED | OUTPATIENT
Start: 2022-12-13 | End: 2023-03-13

## 2023-03-13 RX ORDER — VENLAFAXINE HYDROCHLORIDE 37.5 MG/1
37.5 CAPSULE, EXTENDED RELEASE ORAL DAILY
Qty: 90 CAPSULE | Refills: 1 | Status: SHIPPED | OUTPATIENT
Start: 2023-03-13

## 2023-03-17 RX ORDER — BUPROPION HYDROCHLORIDE 150 MG/1
150 TABLET ORAL DAILY
Qty: 90 TABLET | Refills: 1 | Status: SHIPPED | OUTPATIENT
Start: 2023-03-17

## 2023-05-18 DIAGNOSIS — Z30.41 ENCOUNTER FOR SURVEILLANCE OF CONTRACEPTIVE PILLS: ICD-10-CM

## 2023-05-18 RX ORDER — NORGESTIMATE AND ETHINYL ESTRADIOL 7DAYSX3 LO
KIT ORAL
Qty: 84 TABLET | Refills: 1 | Status: SHIPPED | OUTPATIENT
Start: 2023-05-18

## 2023-08-11 DIAGNOSIS — F33.0 MAJOR DEPRESSIVE DISORDER, RECURRENT, MILD: ICD-10-CM

## 2023-08-11 DIAGNOSIS — F41.9 ANXIETY: Primary | ICD-10-CM

## 2023-10-09 RX ORDER — BUPROPION HYDROCHLORIDE 150 MG/1
150 TABLET ORAL DAILY
Qty: 90 TABLET | Refills: 1 | Status: SHIPPED | OUTPATIENT
Start: 2023-10-09

## 2023-10-20 DIAGNOSIS — Z30.41 ENCOUNTER FOR SURVEILLANCE OF CONTRACEPTIVE PILLS: ICD-10-CM

## 2023-10-20 RX ORDER — NORGESTIMATE AND ETHINYL ESTRADIOL 7DAYSX3 LO
1 KIT ORAL DAILY
Qty: 84 TABLET | Refills: 0 | Status: SHIPPED | OUTPATIENT
Start: 2023-10-20

## 2023-11-13 ENCOUNTER — LAB (OUTPATIENT)
Dept: LAB | Facility: HOSPITAL | Age: 33
End: 2023-11-13
Payer: COMMERCIAL

## 2023-11-13 DIAGNOSIS — N92.6 MISSED MENSES: ICD-10-CM

## 2023-11-13 DIAGNOSIS — N92.6 MISSED MENSES: Primary | ICD-10-CM

## 2023-11-13 LAB — HCG SERPL QL: POSITIVE

## 2023-11-13 PROCEDURE — 84703 CHORIONIC GONADOTROPIN ASSAY: CPT

## 2023-11-13 PROCEDURE — 36415 COLL VENOUS BLD VENIPUNCTURE: CPT

## 2023-11-16 DIAGNOSIS — N93.9 VAGINAL BLEEDING: Primary | ICD-10-CM

## 2023-11-16 DIAGNOSIS — Z34.90 PREGNANCY, UNSPECIFIED GESTATIONAL AGE: ICD-10-CM

## 2023-11-17 ENCOUNTER — LAB (OUTPATIENT)
Dept: LAB | Facility: HOSPITAL | Age: 33
End: 2023-11-17
Payer: COMMERCIAL

## 2023-11-17 DIAGNOSIS — N93.9 VAGINAL BLEEDING: ICD-10-CM

## 2023-11-17 DIAGNOSIS — Z34.90 PREGNANCY, UNSPECIFIED GESTATIONAL AGE: ICD-10-CM

## 2023-11-17 LAB — HCG INTACT+B SERPL-ACNC: 7.19 MIU/ML

## 2023-11-17 PROCEDURE — 84702 CHORIONIC GONADOTROPIN TEST: CPT

## 2023-11-17 PROCEDURE — 36415 COLL VENOUS BLD VENIPUNCTURE: CPT

## 2023-11-24 ENCOUNTER — DOCUMENTATION (OUTPATIENT)
Dept: FAMILY MEDICINE CLINIC | Facility: CLINIC | Age: 33
End: 2023-11-24
Payer: COMMERCIAL

## 2023-11-24 RX ORDER — BUPROPION HYDROCHLORIDE 150 MG/1
150 TABLET ORAL DAILY
Qty: 90 TABLET | Refills: 1 | Status: SHIPPED | OUTPATIENT
Start: 2023-11-24 | End: 2023-11-26

## 2023-11-24 RX ORDER — VENLAFAXINE HYDROCHLORIDE 37.5 MG/1
37.5 CAPSULE, EXTENDED RELEASE ORAL DAILY
Qty: 90 CAPSULE | Refills: 1 | Status: SHIPPED | OUTPATIENT
Start: 2023-11-24

## 2023-11-26 RX ORDER — BUPROPION HYDROCHLORIDE 300 MG/1
300 TABLET ORAL DAILY
Qty: 90 TABLET | Refills: 1 | Status: SHIPPED | OUTPATIENT
Start: 2023-11-26

## 2024-02-26 RX ORDER — BUPROPION HYDROCHLORIDE 300 MG/1
300 TABLET ORAL DAILY
Qty: 90 TABLET | Refills: 1 | OUTPATIENT
Start: 2024-02-26